# Patient Record
Sex: MALE | Race: WHITE
[De-identification: names, ages, dates, MRNs, and addresses within clinical notes are randomized per-mention and may not be internally consistent; named-entity substitution may affect disease eponyms.]

---

## 2020-05-19 ENCOUNTER — HOSPITAL ENCOUNTER (EMERGENCY)
Dept: HOSPITAL 54 - ER | Age: 40
Discharge: HOME | End: 2020-05-19
Payer: COMMERCIAL

## 2020-05-19 VITALS — HEIGHT: 75 IN | WEIGHT: 190 LBS | BODY MASS INDEX: 23.62 KG/M2

## 2020-05-19 VITALS — DIASTOLIC BLOOD PRESSURE: 82 MMHG | SYSTOLIC BLOOD PRESSURE: 131 MMHG
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
## 2020-05-19 DIAGNOSIS — S62.336A: Primary | ICD-10-CM
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
## 2020-05-19 PROCEDURE — 26650 TREAT THUMB FRACTURE: CPT

## 2020-05-19 PROCEDURE — 73130 X-RAY EXAM OF HAND: CPT

## 2020-05-19 PROCEDURE — 99284 EMERGENCY DEPT VISIT MOD MDM: CPT
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
